# Patient Record
Sex: FEMALE | Race: BLACK OR AFRICAN AMERICAN | Employment: OTHER | URBAN - METROPOLITAN AREA
[De-identification: names, ages, dates, MRNs, and addresses within clinical notes are randomized per-mention and may not be internally consistent; named-entity substitution may affect disease eponyms.]

---

## 2019-10-09 ENCOUNTER — OFFICE VISIT (OUTPATIENT)
Dept: SURGERY | Age: 74
End: 2019-10-09

## 2019-10-09 VITALS
DIASTOLIC BLOOD PRESSURE: 92 MMHG | HEIGHT: 61 IN | RESPIRATION RATE: 18 BRPM | WEIGHT: 196 LBS | TEMPERATURE: 98.3 F | OXYGEN SATURATION: 98 % | BODY MASS INDEX: 37 KG/M2 | SYSTOLIC BLOOD PRESSURE: 198 MMHG | HEART RATE: 64 BPM

## 2019-10-09 DIAGNOSIS — E66.01 SEVERE OBESITY (HCC): ICD-10-CM

## 2019-10-09 DIAGNOSIS — C50.912 BREAST CANCER, STAGE 2, LEFT (HCC): Primary | ICD-10-CM

## 2019-10-09 RX ORDER — ASPIRIN 81 MG/1
81 TABLET ORAL
COMMUNITY
Start: 2016-02-16

## 2019-10-09 RX ORDER — LISINOPRIL AND HYDROCHLOROTHIAZIDE 20; 25 MG/1; MG/1
1 TABLET ORAL
COMMUNITY
Start: 2019-06-28

## 2019-10-09 RX ORDER — LANCETS 33 GAUGE
1 EACH MISCELLANEOUS
COMMUNITY
Start: 2017-01-11

## 2019-10-09 NOTE — PROGRESS NOTES
[facility]  General Surgery    Subjective:      HPI: Patient is a very pleasant 57-year-old female with a past medical history remarkable for morbid obesity with BMI 37.03 kg/m², hypertension and breast cancer diagnosed in 2004. The patient is referred to us by Dr. Allyson Armstrong of [facility] for evaluation and management of left breast cancer. Patient states that she was first diagnosed with left breast cancer in 2004 while living in [place]. She chose not to pursue any treatment at that time. She presented to Dr. Allyson Armstrong at [facility] in 2012. In September 2012 core mammotome biopsy revealed that the 5 cm mass in the upper outer quadrant of the left breast was invasive ductal adenocarcinoma ER DE positive HER-2 negative. The patient was not interested in allopathic treatment at the time. She attempted dietary modification to a plant-based diet however after 1 year on the plant-based diet she added back in fish and eggs and dairy. Her primary doctor suggested that she see the surgeon again regarding treatment options. Dr. Allyson Armstrong does not accept her insurance in his office therefore she was referred to us. The patient still very ambivalent about allopathic management. She states that she knows many people who have been diagnosed with different cancers since her diagnosis in 2004 and most of them are dead. She then relates that she has a sister who was diagnosed with breast cancer at age 46 who is still alive. She is unclear of what her sister's treatment were. She does know that her sister had some form of radiation treatment. The patient is interested in radiation treatment as well. Patient Active Problem List    Diagnosis Date Noted    Severe obesity ([address] 10/09/2019     Past Medical History:   Diagnosis Date    Cancer [facility])     left breast cancer 2004 in [facility] then again 2012 by dr Prerna Bhatt Hypertension       No past surgical history on file. No family history on file. Social History     Tobacco Use    Smoking status: Former Smoker     Last attempt to quit: 10/9/1986     Years since quittin.0    Smokeless tobacco: Never Used   Substance Use Topics    Alcohol use: Yes     Frequency: 2-4 times a month      Allergies   Allergen Reactions    Pcn [Penicillins] Nausea Only       Prior to Admission medications    Medication Sig Start Date End Date Taking? Authorizing Provider   aspirin delayed-release 81 mg tablet Take 81 mg by mouth. 16  Yes Provider, Historical   lancets 33 gauge misc 1 Units. 17  Yes Provider, Historical   glucose blood VI test strips (ASCENSIA AUTODISC VI, ONE TOUCH ULTRA TEST VI) strip 50 Each. 17  Yes Provider, Historical   lisinopril-hydroCHLOROthiazide (PRINZIDE, ZESTORETIC) 20-25 mg per tablet Take 1 Tab by mouth. 19  Yes Provider, Historical       Review of Systems:    14 systems were reviewed. The results are as above in the HPI and otherwise negative. Objective:     Vitals:    10/09/19 1024 10/09/19 1029   BP: (!) 200/92 (!) 198/92   Pulse: 64    Resp: 18    Temp: 98.3 °F (36.8 °C)    SpO2: 98%    Weight: 88.9 kg (196 lb)    Height: 5' 1\" (1.549 m)        Physical Exam:  GENERAL: alert, cooperative, no distress, appears stated age,   EYE: conjunctivae/corneas clear. PERRL, EOM's intact. THROAT & NECK: normal and no erythema or exudates noted. ,    LYMPHATIC: Cervical, supraclavicular, and axillary nodes normal. ,   LUNG: clear to auscultation bilaterally,   HEART: regular rate and rhythm, S1, S2 normal, no murmur, click, rub or gallop,   BREASTS:   Left: There is a 13 x 15 cm mass present in the upper inner quadrant of the left breast without overlying erythema skin no surrounding fluctuance or crepitance. It is mobile to the underlying tissues but fixed to the skin. No axillary or supraclavicular lymphadenopathy. No mass  Right: No dimpling, discoloration, nipple inversion or retractions.     No axillary or supraclavicular lymphadenopathy. No mass  EXTREMITIES:  extremities normal, atraumatic, no cyanosis or edema,   SKIN: Normal.,   NEUROLOGIC: AOx3. Cranial nerves 2-12 and sensation grossly intact. ,     Data Review: We discussed a staging work-up which would include bilateral diagnostic mammography 3D, bilateral breast MRI, CT chest abdomen pelvis, whole-body bone scan. Impression:     · Patient with left breast cancer which has been untreated since 2004. Plan:     · The patient will follow with us when she has decided if she wants to proceed with the staging work-up. She does have a referral appointment to Dr. Cecilio Amado radiation oncology.

## 2019-10-09 NOTE — PATIENT INSTRUCTIONS

## 2019-10-09 NOTE — PROGRESS NOTES
Review of Systems   Constitutional: Negative. HENT: Negative. Eyes: Negative. Respiratory: Negative. Cardiovascular: Negative. Gastrointestinal: Negative. Genitourinary: Negative. Musculoskeletal: Negative for back pain, falls, joint pain, myalgias and neck pain. Skin: Negative. Neurological: Negative. Endo/Heme/Allergies: Negative. Psychiatric/Behavioral: Negative. Chief Complaint   Patient presents with    Breast Cancer     referred by Dr. Danuta Sanchez for left breast cancer diagnosed in 2012   Ms. Zepeda has been given the following recommendations today due to her elevated BP reading: patient has blood pressure medication with her and is now taking prinzide 20-25 milligram tablet now

## 2019-10-10 ENCOUNTER — NURSE NAVIGATOR (OUTPATIENT)
Dept: SURGERY | Age: 74
End: 2019-10-10

## 2019-10-10 NOTE — PROGRESS NOTES
I was present during Ms Zepeda's initial appointment with Dr Kaitlin Walker on 10/9/19. Ms Otis Jernigan is a retired  who  was first diagnosed with cancer of the left breast in 2004, in [place], at which time she denied treatment. In 2012, after moving to [place], Dr Gayathri Murphy performed a biopsy. Ms Otis Jernigan again denied treatment and decided to follow an organic, clean diet in hopes of treating the cancer. Ms Zepeda's left breat tumor is protruding from breast and measures 13 cm x 15 cm. She denies pain. Dr Robbie Butcher explained treatment modalities to Ms Otis Jernigan. When asked why she had decided to forgo medical advice, Ms Otis Jernigan replied that she has known three people who had clinical oncology treatment for their cancers, and all had passed away. However, she has a  younger sister who was treated for breast cancer with, what she remembers as Rosie Elliott radiation\", and is still living. She reports that she is somewhat estranged from her sister. She is also concerned about quality of life. She is , and lost her only child in 2000. Dr Robbie Butcher explained that he would like to order an MRI, bone scan, chest xray, and blood work. Ms Otis Jernigan appeared apprehensive and stated that she would have to \"think about it\". After Dr Robbie Butcher left the examination room, I explained to Ms Otis Jernigan that the final decision would be her own, and that our medical staff would only thoroughly explain the NCCN guidelines for breast cancer and explain the options that would lead to the best possible outcome for management. When I asked Ms Otis Jernigan if the apprehension concerning the diagnostic testing was related to a fear of finding out more, possibly extensive cancer, she replied \"I don't think that I could handle the worst\". I offered emotional support and suggested that she take Dr Zina Jones recommendations one step at a time. She has an appointment with Dr Brooklynn Marie, radiation oncologist, on 10/14/19.   She stated that she is going to wait until talking with Dr Arben Butcher before making a decision about further testing. I provided my contact information and encouraged Ms William Barrientos to call me with any questions, concerns, or for emotional support.

## 2019-10-14 ENCOUNTER — HOSPITAL ENCOUNTER (OUTPATIENT)
Dept: RADIATION THERAPY | Age: 74
Discharge: HOME | End: 2019-10-14
Payer: MEDICARE

## 2019-10-14 PROCEDURE — 99211 OFF/OP EST MAY X REQ PHY/QHP: CPT

## 2019-10-21 ENCOUNTER — NURSE NAVIGATOR (OUTPATIENT)
Dept: SURGERY | Age: 74
End: 2019-10-21

## 2019-10-21 NOTE — PROGRESS NOTES
Since her initial appointment with Dr Paolo Angulo to discuss surgical treatment options, Ms Mark Perry has had appointments with Dr Cecilio Amado (rad onc), and Dr Joce Brock of [facility]. According to Dr Isabelle Romo,  Neoadjuvant chemotherapy is recommended but Ms Mark Perry is not agreeable to chemotherapy at this time. She is open to the idea of neoadjuvant endocrine therapy. Dr Isabelle Romo has ordered a bone scan and CT scan, and is aware that Dr Paolo Angulo requires a bilateral MRI and updated diagnostic mammogram.  She is scheduled to follow up with Dr Isabelle Romo in 2-3 weeks. Dr Isabelle Romo discussed genetic testing with Ms Mark Perry, as well and plans to discuss it further during her next appointment.